# Patient Record
Sex: FEMALE | Race: OTHER | NOT HISPANIC OR LATINO | URBAN - METROPOLITAN AREA
[De-identification: names, ages, dates, MRNs, and addresses within clinical notes are randomized per-mention and may not be internally consistent; named-entity substitution may affect disease eponyms.]

---

## 2023-04-10 ENCOUNTER — EMERGENCY (EMERGENCY)
Facility: HOSPITAL | Age: 33
LOS: 1 days | Discharge: HOME | End: 2023-04-10
Attending: STUDENT IN AN ORGANIZED HEALTH CARE EDUCATION/TRAINING PROGRAM
Payer: COMMERCIAL

## 2023-04-10 VITALS
DIASTOLIC BLOOD PRESSURE: 83 MMHG | OXYGEN SATURATION: 99 % | RESPIRATION RATE: 18 BRPM | TEMPERATURE: 98 F | SYSTOLIC BLOOD PRESSURE: 135 MMHG | HEART RATE: 80 BPM

## 2023-04-10 VITALS
WEIGHT: 171.96 LBS | OXYGEN SATURATION: 98 % | DIASTOLIC BLOOD PRESSURE: 84 MMHG | RESPIRATION RATE: 17 BRPM | SYSTOLIC BLOOD PRESSURE: 156 MMHG | HEART RATE: 95 BPM | TEMPERATURE: 97 F

## 2023-04-10 DIAGNOSIS — O00.90 UNSPECIFIED ECTOPIC PREGNANCY WITHOUT INTRAUTERINE PREGNANCY: ICD-10-CM

## 2023-04-10 LAB
ALBUMIN SERPL ELPH-MCNC: 4 G/DL — SIGNIFICANT CHANGE UP (ref 3.5–5)
ALP SERPL-CCNC: 56 U/L — SIGNIFICANT CHANGE UP (ref 40–120)
ALT FLD-CCNC: 43 U/L DA — SIGNIFICANT CHANGE UP (ref 10–60)
ANION GAP SERPL CALC-SCNC: 6 MMOL/L — SIGNIFICANT CHANGE UP (ref 5–17)
APTT BLD: 36.4 SEC — HIGH (ref 27.5–35.5)
AST SERPL-CCNC: 23 U/L — SIGNIFICANT CHANGE UP (ref 10–40)
BASOPHILS # BLD AUTO: 0.05 K/UL — SIGNIFICANT CHANGE UP (ref 0–0.2)
BASOPHILS NFR BLD AUTO: 0.4 % — SIGNIFICANT CHANGE UP (ref 0–2)
BILIRUB SERPL-MCNC: 0.3 MG/DL — SIGNIFICANT CHANGE UP (ref 0.2–1.2)
BLD GP AB SCN SERPL QL: SIGNIFICANT CHANGE UP
BUN SERPL-MCNC: 11 MG/DL — SIGNIFICANT CHANGE UP (ref 7–18)
CALCIUM SERPL-MCNC: 9.9 MG/DL — SIGNIFICANT CHANGE UP (ref 8.4–10.5)
CHLORIDE SERPL-SCNC: 107 MMOL/L — SIGNIFICANT CHANGE UP (ref 96–108)
CO2 SERPL-SCNC: 25 MMOL/L — SIGNIFICANT CHANGE UP (ref 22–31)
CREAT SERPL-MCNC: 0.69 MG/DL — SIGNIFICANT CHANGE UP (ref 0.5–1.3)
EGFR: 120 ML/MIN/1.73M2 — SIGNIFICANT CHANGE UP
EOSINOPHIL # BLD AUTO: 0.16 K/UL — SIGNIFICANT CHANGE UP (ref 0–0.5)
EOSINOPHIL NFR BLD AUTO: 1.3 % — SIGNIFICANT CHANGE UP (ref 0–6)
GLUCOSE SERPL-MCNC: 98 MG/DL — SIGNIFICANT CHANGE UP (ref 70–99)
HCG SERPL-ACNC: 377 MIU/ML — HIGH
HCT VFR BLD CALC: 43.7 % — SIGNIFICANT CHANGE UP (ref 34.5–45)
HGB BLD-MCNC: 14.4 G/DL — SIGNIFICANT CHANGE UP (ref 11.5–15.5)
IMM GRANULOCYTES NFR BLD AUTO: 0.6 % — SIGNIFICANT CHANGE UP (ref 0–0.9)
INR BLD: 1.02 RATIO — SIGNIFICANT CHANGE UP (ref 0.88–1.16)
LYMPHOCYTES # BLD AUTO: 2.81 K/UL — SIGNIFICANT CHANGE UP (ref 1–3.3)
LYMPHOCYTES # BLD AUTO: 22.6 % — SIGNIFICANT CHANGE UP (ref 13–44)
MCHC RBC-ENTMCNC: 31 PG — SIGNIFICANT CHANGE UP (ref 27–34)
MCHC RBC-ENTMCNC: 33 GM/DL — SIGNIFICANT CHANGE UP (ref 32–36)
MCV RBC AUTO: 94 FL — SIGNIFICANT CHANGE UP (ref 80–100)
MONOCYTES # BLD AUTO: 0.8 K/UL — SIGNIFICANT CHANGE UP (ref 0–0.9)
MONOCYTES NFR BLD AUTO: 6.4 % — SIGNIFICANT CHANGE UP (ref 2–14)
NEUTROPHILS # BLD AUTO: 8.56 K/UL — HIGH (ref 1.8–7.4)
NEUTROPHILS NFR BLD AUTO: 68.7 % — SIGNIFICANT CHANGE UP (ref 43–77)
NRBC # BLD: 0 /100 WBCS — SIGNIFICANT CHANGE UP (ref 0–0)
PLATELET # BLD AUTO: 272 K/UL — SIGNIFICANT CHANGE UP (ref 150–400)
POTASSIUM SERPL-MCNC: 3.8 MMOL/L — SIGNIFICANT CHANGE UP (ref 3.5–5.3)
POTASSIUM SERPL-SCNC: 3.8 MMOL/L — SIGNIFICANT CHANGE UP (ref 3.5–5.3)
PROT SERPL-MCNC: 8.2 G/DL — SIGNIFICANT CHANGE UP (ref 6–8.3)
PROTHROM AB SERPL-ACNC: 12.1 SEC — SIGNIFICANT CHANGE UP (ref 10.5–13.4)
RBC # BLD: 4.65 M/UL — SIGNIFICANT CHANGE UP (ref 3.8–5.2)
RBC # FLD: 11.9 % — SIGNIFICANT CHANGE UP (ref 10.3–14.5)
SODIUM SERPL-SCNC: 138 MMOL/L — SIGNIFICANT CHANGE UP (ref 135–145)
WBC # BLD: 12.45 K/UL — HIGH (ref 3.8–10.5)
WBC # FLD AUTO: 12.45 K/UL — HIGH (ref 3.8–10.5)

## 2023-04-10 PROCEDURE — 86901 BLOOD TYPING SEROLOGIC RH(D): CPT

## 2023-04-10 PROCEDURE — 99285 EMERGENCY DEPT VISIT HI MDM: CPT

## 2023-04-10 PROCEDURE — 80053 COMPREHEN METABOLIC PANEL: CPT

## 2023-04-10 PROCEDURE — 86900 BLOOD TYPING SEROLOGIC ABO: CPT

## 2023-04-10 PROCEDURE — 76801 OB US < 14 WKS SINGLE FETUS: CPT | Mod: 26

## 2023-04-10 PROCEDURE — 85730 THROMBOPLASTIN TIME PARTIAL: CPT

## 2023-04-10 PROCEDURE — 76801 OB US < 14 WKS SINGLE FETUS: CPT

## 2023-04-10 PROCEDURE — 76817 TRANSVAGINAL US OBSTETRIC: CPT | Mod: 26

## 2023-04-10 PROCEDURE — 84702 CHORIONIC GONADOTROPIN TEST: CPT

## 2023-04-10 PROCEDURE — 36415 COLL VENOUS BLD VENIPUNCTURE: CPT

## 2023-04-10 PROCEDURE — 99284 EMERGENCY DEPT VISIT MOD MDM: CPT | Mod: 25

## 2023-04-10 PROCEDURE — 96372 THER/PROPH/DIAG INJ SC/IM: CPT

## 2023-04-10 PROCEDURE — 85025 COMPLETE CBC W/AUTO DIFF WBC: CPT

## 2023-04-10 PROCEDURE — 76817 TRANSVAGINAL US OBSTETRIC: CPT

## 2023-04-10 PROCEDURE — 86850 RBC ANTIBODY SCREEN: CPT

## 2023-04-10 PROCEDURE — 85610 PROTHROMBIN TIME: CPT

## 2023-04-10 RX ORDER — METHOTREXATE 2.5 MG/1
90 TABLET ORAL ONCE
Refills: 0 | Status: COMPLETED | OUTPATIENT
Start: 2023-04-10 | End: 2023-04-10

## 2023-04-10 RX ADMIN — METHOTREXATE 90 MILLIGRAM(S): 2.5 TABLET ORAL at 22:29

## 2023-04-10 NOTE — ED PROVIDER NOTE - PHYSICAL EXAMINATION
General: well appearing female, no acute distress   HEENT: normocephalic, atraumatic   Respiratory: normal work of breathing  Abdomen: soft, non-tender, no guarding or rebound   MSK: no swelling or tenderness of lower extremities, moving all extremities spontaneously   Skin: warm, dry   Neuro: A&Ox3  Psych: appropriate affect

## 2023-04-10 NOTE — ED PROVIDER NOTE - OBJECTIVE STATEMENT
32F, , LMP 23, presenting with concern for ectopic pregnancy. patient is following with her outpatient OB and had vaginal bleeding for 3 weeks and had a drop in her beta-Hcg. had a D&C performed but pathology showed no products of conception and beta-Hcg still elevated.

## 2023-04-10 NOTE — CONSULT NOTE ADULT - SUBJECTIVE AND OBJECTIVE BOX
31yo female  LMP 23 presents to ED as instructed by her Ob/GYn Dr. Ángel Toledo for methotrexate for suspected ectopic pregnancy. Pt states she has been followed since 3/14/23 after first episode of vaginal bleeding at 5-6weeks pregnant. She shows records of her labs as follows:  Bhcg 3/14 1158          3/24 602          3/30 659  On 23, patient had a D&C however pathology report shows no products of conception and on follow up , her Bhcg was 548 at which time he instructed her to come in.  Pt states she has no acute issues or complaints. Denies vaginal bleeding, abdominal/pelvic pain, nausea/vomiting, chest pains, sob, headache, or any other symptoms.    PMH: denies  PSH: Lasik  Pgyn: denies h/o fibroids, ovarian cysts, abnormal pap smear  Meds: none  Allergies; NKDA  Social: denies smoking, etoh or drug use    PE: Pt seen with Dr. Jackson, seated comfortably and in NAD  ICU Vital Signs Last 24 Hrs  T(C): 36.9 (10 Apr 2023 20:24), Max: 36.9 (10 Apr 2023 20:24)  T(F): 98.4 (10 Apr 2023 20:24), Max: 98.4 (10 Apr 2023 20:24)  HR: 80 (10 Apr 2023 20:24) (80 - 95)  BP: 135/83 (10 Apr 2023 20:24) (135/83 - 156/84)  RR: 18 (10 Apr 2023 20:24) (17 - 18)  SpO2: 99% (10 Apr 2023 20:24) (98% - 99%)    O2 Parameters below as of 10 Apr 2023 20:24  Patient On (Oxygen Delivery Method): room air    Abd: soft, NT; no guarding or rebound  Gyn: patient defers vaginal exam, no bleeding reported    Labs                        14.4   12.45 )-----------( 272      ( 10 Apr 2023 18:21 )             43.7     04-10    138  |  107  |  11  ----------------------------<  98  3.8   |  25  |  0.69    Ca    9.9      10 Apr 2023 18:21    TPro  8.2  /  Alb  4.0  /  TBili  0.3  /  DBili  x   /  AST  23  /  ALT  43  /  AlkPhos  56  04-10    ABO RH Interpretation: O POS    HCG Quantitative, Serum: 377    US Transvaginal, OB (04.10.23 @ 17:14) >  PROCEDURE DATE:  04/10/2023    INTERPRETATION:  CLINICAL INFORMATION: Evaluate forectopic pregnancy.  LMP: 2023  Estimated Gestational Age by LMP: 9 weeks 5 days  COMPARISON: None available.  Endovaginal and transabdominal pelvic sonogram.  FINDINGS:  Uterus: 6.5 x 3.5 x 3.8 cm. There is a subserosal fibroid measuring 1.7 x   2.2 x 1.8 cm. No intrauterine or extrauterine gestational sac identified.  Endometrium measures 6 mm in thickness.  Right ovary: 1.6 cm x 1.5 cm x 2.1 cm. Within normal limits. Simple right   ovarian cyst measuring 1.6 x 1.5 x 1.5 cm. There is color Doppler flow.  Left ovary: 3.0 cm x 1.3 cm x 2.4 cm. Within normal limits. There is   color Doppler flow.  Fluid: Small amount of complex fluid in the cul-de-sac.  IMPRESSION:  Neither an intrauterine nor extrauterine gestation is identified. This   represents a pregnancy of indeterminate location. Differential diagnosis   includes early normal IUP, pregnancy failure or ectopic pregnancy. Serial   hCG and ultrasound are recommended to determine the significance of these   findings.    Dr. Jackson at bedside

## 2023-04-10 NOTE — ED PROVIDER NOTE - CLINICAL SUMMARY MEDICAL DECISION MAKING FREE TEXT BOX
32-year-old female presenting with concern for ectopic pregnancy.  Patient nontender on exam and has no vaginal bleeding.  Will repeat beta-hCG and ultrasound to assess for possible ectopic pregnancy.  Will consult OB.

## 2023-04-10 NOTE — ED PROVIDER NOTE - PROGRESS NOTE DETAILS
consult delayed 2/2 emergency on the floor. awaiting GYN. John Curry patient again updated on delay. attending still managing emergent case. John Curry GYn at bedside. will consent patient for methotrexate. John Curry

## 2023-04-10 NOTE — ED PROVIDER NOTE - NSFOLLOWUPINSTRUCTIONS_ED_ALL_ED_FT
You were seen in the emergency department for concern for ectopic pregnancy.    Please follow-up with your OB/GYN within the next 48 hours.    Please take Tylenol and ibuprofen as prescribed on the bottles for pain control.    If you have any worsening symptoms, severe abdominal pain chest pain or trouble breathing or you develop a fever please return to the emergency department.

## 2023-04-10 NOTE — CONSULT NOTE ADULT - PROBLEM SELECTOR RECOMMENDATION 9
methotrexate 90mg ordered  consents obtained and risk/benefits and side effects explained  Precautions given to return to ED if any worsening pain, vaginal bleeding, chest pains, SOB, palpitations, vomiting, etc  Instructed to follow up with her Ob/Gyn on Friday for repeat labs; if unable , return to ED

## 2023-04-10 NOTE — ED PROVIDER NOTE - PATIENT PORTAL LINK FT
You can access the FollowMyHealth Patient Portal offered by [facility] by registering at the following website: [email or web address]. By joining [facility]’s FollowMyHealth portal, you will also be able to view your health information using other applications (apps) compatible with our system.

## 2024-03-04 NOTE — ED ADULT NURSE NOTE - MODE OF DISCHARGE
Return call to patient. Patient states she wants to be seen either Thursday or Friday this week. Informed patient Dr. Mason next available appointment is not until June. Patient states she needs to be seen. Informed patient there is no available appointments and I can schedule patient at next available, VV was offered to patient but patient states she does not have a phone. Patient scheduled in May. Appointment letter mailed to patient address on file. Patient verbalized understanding. Also instructed patient if she is having trouble walking and swelling to go to the nearest ER.    Ambulatory

## 2024-04-24 ENCOUNTER — APPOINTMENT (OUTPATIENT)
Dept: OBGYN | Facility: CLINIC | Age: 34
End: 2024-04-24
Payer: COMMERCIAL

## 2024-04-24 ENCOUNTER — APPOINTMENT (OUTPATIENT)
Dept: ANTEPARTUM | Facility: CLINIC | Age: 34
End: 2024-04-24
Payer: COMMERCIAL

## 2024-04-24 ENCOUNTER — ASOB RESULT (OUTPATIENT)
Age: 34
End: 2024-04-24

## 2024-04-24 ENCOUNTER — TRANSCRIPTION ENCOUNTER (OUTPATIENT)
Age: 34
End: 2024-04-24

## 2024-04-24 VITALS
SYSTOLIC BLOOD PRESSURE: 151 MMHG | DIASTOLIC BLOOD PRESSURE: 84 MMHG | BODY MASS INDEX: 33.68 KG/M2 | HEIGHT: 62 IN | WEIGHT: 183 LBS

## 2024-04-24 VITALS — DIASTOLIC BLOOD PRESSURE: 76 MMHG | SYSTOLIC BLOOD PRESSURE: 139 MMHG

## 2024-04-24 DIAGNOSIS — Z36.82 ENCOUNTER FOR ANTENATAL SCREENING FOR NUCHAL TRANSLUCENCY: ICD-10-CM

## 2024-04-24 PROBLEM — Z00.00 ENCOUNTER FOR PREVENTIVE HEALTH EXAMINATION: Status: ACTIVE | Noted: 2024-04-24

## 2024-04-24 PROCEDURE — 76813 OB US NUCHAL MEAS 1 GEST: CPT

## 2024-04-24 PROCEDURE — 99213 OFFICE O/P EST LOW 20 MIN: CPT

## 2024-04-24 PROCEDURE — 76801 OB US < 14 WKS SINGLE FETUS: CPT

## 2024-04-26 NOTE — HISTORY OF PRESENT ILLNESS
[FreeTextEntry1] : Patient is a 33 year old presenting at 12w seen in my office today for nuchal translucency testing. KODY 11/1/24. The limitations of testing were discussed with the patient and she was informed that this is a screening test. If she desires a diagnostic test like CVS or Amniocentesis, this may be performed. On sono today, 6cm lateral myoma noted

## 2024-04-26 NOTE — DISCUSSION/SUMMARY
[FreeTextEntry1] : The significance of nuchal translucency testing was explained to the patient and ultrasound was performed. The sensitivity of the test can be improved by combining with second trimester quad screening. This type of sequential testing minimally increases the false positive rate, but the detection rate for Down syndrome is increased. If the sequential testing is desired, a second stage quad should be drawn and sent. As an alternative, this can be done in our office. If the patient does not desire sequential testing, then a single marker for AFP may be sent to any lab after 15 completed weeks gestation.  Prenatal diagnostic testing is clinically indicated for this patient. The limitations of NIPT testing were discussed with the patient and amniocentesis was noted to remain the gold standard for prenatal diagnostic testing. The significance of NIPT testing was reviewed and offered to the patient and she has agreed to testing. Blood was drawn and the results will be sent to your office in approximately 2 weeks. Sequential screening is recommended between 15-16 weeks. Anatomy scan is recommended at 19-20 weeks.

## 2024-04-30 LAB
ADDITIONAL US: NORMAL
CRL SCAN TWIN B: NORMAL
CRL SCAN: NORMAL
CROWN RUMP LENGTH TWIN B: NORMAL
CROWN RUMP LENGTH: 63.8 MM
DIA MOM: 1.25
DIA VALUE: 256.1 PG/ML
DOWN SYNDROME AGE RISK: NORMAL
DOWN SYNDROME INTERPRETATION: NORMAL
DOWN SYNDROME SCREENING RISK: NORMAL
FIRST TRIMESTER SCREEN COMMENTS: NORMAL
FIRST TRIMESTER SCREEN NOTE: NORMAL
FIRST TRIMESTER SCREEN RESULTS: NORMAL
FIRST TRIMESTER SCREEN TEST RESULTS: NORMAL
GEST. AGE ON COLLECTION DATE: 12.6 WEEKS
HCG MOM: 1.07
HCG VALUE: 86.4 IU/ML
MATERNAL AGE AT EDD: 32.1 YR
NT MOM TWIN B: NORMAL
NT TWIN B: NORMAL
NUCHAL TRANSLUCENCY (NT): 1.5 MM
NUCHAL TRANSLUCENCY MOM: 1.1
NUMBER OF FETUSES: 1
PAPP-A MOM: 0.8
PAPP-A VALUE: 644 NG/ML
RACE: NORMAL
SONOGRAPHER ID#: NORMAL
TRISOMY 18 AGE RISK: NORMAL
TRISOMY 18 INTERPRETATION: NORMAL
TRISOMY 18 SCREENING RISK: NORMAL
WEIGHT AFP: 183 LBS

## 2024-06-19 ENCOUNTER — APPOINTMENT (OUTPATIENT)
Dept: ANTEPARTUM | Facility: CLINIC | Age: 34
End: 2024-06-19
Payer: COMMERCIAL

## 2024-06-19 ENCOUNTER — APPOINTMENT (OUTPATIENT)
Dept: OBGYN | Facility: CLINIC | Age: 34
End: 2024-06-19
Payer: COMMERCIAL

## 2024-06-19 ENCOUNTER — ASOB RESULT (OUTPATIENT)
Age: 34
End: 2024-06-19

## 2024-06-19 VITALS
WEIGHT: 185 LBS | HEIGHT: 62 IN | BODY MASS INDEX: 34.04 KG/M2 | DIASTOLIC BLOOD PRESSURE: 83 MMHG | SYSTOLIC BLOOD PRESSURE: 157 MMHG

## 2024-06-19 VITALS — SYSTOLIC BLOOD PRESSURE: 148 MMHG | DIASTOLIC BLOOD PRESSURE: 79 MMHG

## 2024-06-19 PROCEDURE — 99213 OFFICE O/P EST LOW 20 MIN: CPT

## 2024-06-19 PROCEDURE — 76811 OB US DETAILED SNGL FETUS: CPT

## 2024-06-19 NOTE — HISTORY OF PRESENT ILLNESS
DOESN'T HAVE A RIDE    [FreeTextEntry1] : 34 y/o female presents to office for anatomy scan. Patient is currently 20 weeks pregnant. Anatomy scan done today. No gross abnormalities noted. Normal growth. Normal fluid. Normal movement. +FHR (see official sono report).  BP today in office is elevated at 157/83 and repeat was 148/79. At present the patient denies headache, visual changes, and RUQ pain.

## 2024-06-19 NOTE — DISCUSSION/SUMMARY
[FreeTextEntry1] : -anatomy sono done today WNL; see official sono report  -I have discussed the implications of hypertension in pregnancy with the patient. I have discussed the association of chronic hypertension with fetal growth issues, as well as the possible development of superimposed preeclampsia. Premature delivery is a possibility because of these associated complications. Pregnancy therefore should be closely monitored. Recommendations: - Baseline EKG. - Baseline eye examination. - Home blood pressure monitoring twice a day. - Baby ASA-81 mg 1.5 tabs daily - Serial fetal growth in second half of pregnancy, fetal testing in 3rd trimester. - If BP elevated, recommended start of procardia 30mg XL  -f/u PRN

## 2024-11-01 ENCOUNTER — INPATIENT (INPATIENT)
Facility: HOSPITAL | Age: 34
LOS: 1 days | Discharge: HOME | End: 2024-11-03
Attending: SPECIALIST | Admitting: SPECIALIST

## 2024-11-01 ENCOUNTER — TRANSCRIPTION ENCOUNTER (OUTPATIENT)
Age: 34
End: 2024-11-01

## 2024-11-01 VITALS
SYSTOLIC BLOOD PRESSURE: 137 MMHG | DIASTOLIC BLOOD PRESSURE: 76 MMHG | RESPIRATION RATE: 18 BRPM | HEART RATE: 69 BPM | TEMPERATURE: 98 F

## 2024-11-01 LAB
BASOPHILS # BLD AUTO: 0.04 K/UL — SIGNIFICANT CHANGE UP (ref 0–0.2)
BASOPHILS NFR BLD AUTO: 0.3 % — SIGNIFICANT CHANGE UP (ref 0–2)
BLD GP AB SCN SERPL QL: NEGATIVE — SIGNIFICANT CHANGE UP
EOSINOPHIL # BLD AUTO: 0.15 K/UL — SIGNIFICANT CHANGE UP (ref 0–0.5)
EOSINOPHIL NFR BLD AUTO: 1.3 % — SIGNIFICANT CHANGE UP (ref 0–6)
HCT VFR BLD CALC: 39.9 % — SIGNIFICANT CHANGE UP (ref 34.5–45)
HCV AB S/CO SERPL IA: 0.04 S/CO — SIGNIFICANT CHANGE UP (ref 0–0.99)
HCV AB SERPL-IMP: SIGNIFICANT CHANGE UP
HGB BLD-MCNC: 13.5 G/DL — SIGNIFICANT CHANGE UP (ref 11.5–15.5)
IANC: 8.04 K/UL — HIGH (ref 1.8–7.4)
IMM GRANULOCYTES NFR BLD AUTO: 0.9 % — SIGNIFICANT CHANGE UP (ref 0–0.9)
LYMPHOCYTES # BLD AUTO: 2.46 K/UL — SIGNIFICANT CHANGE UP (ref 1–3.3)
LYMPHOCYTES # BLD AUTO: 21.2 % — SIGNIFICANT CHANGE UP (ref 13–44)
MCHC RBC-ENTMCNC: 29.8 PG — SIGNIFICANT CHANGE UP (ref 27–34)
MCHC RBC-ENTMCNC: 33.8 G/DL — SIGNIFICANT CHANGE UP (ref 32–36)
MCV RBC AUTO: 88.1 FL — SIGNIFICANT CHANGE UP (ref 80–100)
MONOCYTES # BLD AUTO: 0.83 K/UL — SIGNIFICANT CHANGE UP (ref 0–0.9)
MONOCYTES NFR BLD AUTO: 7.1 % — SIGNIFICANT CHANGE UP (ref 2–14)
NEUTROPHILS # BLD AUTO: 8.04 K/UL — HIGH (ref 1.8–7.4)
NEUTROPHILS NFR BLD AUTO: 69.2 % — SIGNIFICANT CHANGE UP (ref 43–77)
NRBC # BLD: 0 /100 WBCS — SIGNIFICANT CHANGE UP (ref 0–0)
NRBC # FLD: 0 K/UL — SIGNIFICANT CHANGE UP (ref 0–0)
PLATELET # BLD AUTO: 268 K/UL — SIGNIFICANT CHANGE UP (ref 150–400)
RBC # BLD: 4.53 M/UL — SIGNIFICANT CHANGE UP (ref 3.8–5.2)
RBC # FLD: 13 % — SIGNIFICANT CHANGE UP (ref 10.3–14.5)
RH IG SCN BLD-IMP: POSITIVE — SIGNIFICANT CHANGE UP
RH IG SCN BLD-IMP: POSITIVE — SIGNIFICANT CHANGE UP
T PALLIDUM AB TITR SER: NEGATIVE — SIGNIFICANT CHANGE UP
WBC # BLD: 11.62 K/UL — HIGH (ref 3.8–10.5)
WBC # FLD AUTO: 11.62 K/UL — HIGH (ref 3.8–10.5)

## 2024-11-01 RX ORDER — ACETAMINOPHEN 500 MG
3 TABLET ORAL
Qty: 0 | Refills: 0 | DISCHARGE
Start: 2024-11-01

## 2024-11-01 RX ORDER — PRAMOXINE HCL 1 %
1 CREAM (GRAM) RECTAL EVERY 4 HOURS
Refills: 0 | Status: DISCONTINUED | OUTPATIENT
Start: 2024-11-01 | End: 2024-11-03

## 2024-11-01 RX ORDER — OXYTOCIN IN D5W-0.2% SODIUM CL 15/250 ML
167 PLASTIC BAG, INJECTION (ML) INTRAVENOUS
Qty: 30 | Refills: 0 | Status: COMPLETED | OUTPATIENT
Start: 2024-11-01 | End: 2024-11-01

## 2024-11-01 RX ORDER — MODIFIED LANOLIN
1 OINTMENT (GRAM) TOPICAL EVERY 6 HOURS
Refills: 0 | Status: DISCONTINUED | OUTPATIENT
Start: 2024-11-01 | End: 2024-11-03

## 2024-11-01 RX ORDER — HYDROCORTISONE 1 %
1 OINTMENT (GRAM) TOPICAL EVERY 6 HOURS
Refills: 0 | Status: DISCONTINUED | OUTPATIENT
Start: 2024-11-01 | End: 2024-11-03

## 2024-11-01 RX ORDER — OXYCODONE HYDROCHLORIDE 30 MG/1
5 TABLET ORAL
Refills: 0 | Status: DISCONTINUED | OUTPATIENT
Start: 2024-11-01 | End: 2024-11-03

## 2024-11-01 RX ORDER — DIBUCAINE 1 %
1 OINTMENT (GRAM) TOPICAL EVERY 6 HOURS
Refills: 0 | Status: DISCONTINUED | OUTPATIENT
Start: 2024-11-01 | End: 2024-11-03

## 2024-11-01 RX ORDER — ACETAMINOPHEN 500 MG
1000 TABLET ORAL ONCE
Refills: 0 | Status: COMPLETED | OUTPATIENT
Start: 2024-11-01 | End: 2024-11-01

## 2024-11-01 RX ORDER — ACETAMINOPHEN 500 MG
975 TABLET ORAL
Refills: 0 | Status: DISCONTINUED | OUTPATIENT
Start: 2024-11-01 | End: 2024-11-03

## 2024-11-01 RX ORDER — MAGNESIUM HYDROXIDE 1200 MG/15ML
30 SUSPENSION ORAL
Refills: 0 | Status: DISCONTINUED | OUTPATIENT
Start: 2024-11-01 | End: 2024-11-03

## 2024-11-01 RX ORDER — SODIUM CHLORIDE 9 MG/ML
3 INJECTION, SOLUTION INTRAMUSCULAR; INTRAVENOUS; SUBCUTANEOUS EVERY 8 HOURS
Refills: 0 | Status: DISCONTINUED | OUTPATIENT
Start: 2024-11-01 | End: 2024-11-03

## 2024-11-01 RX ORDER — CITRIC ACID/SODIUM CITRATE 334-500MG
15 SOLUTION, ORAL ORAL EVERY 6 HOURS
Refills: 0 | Status: DISCONTINUED | OUTPATIENT
Start: 2024-11-01 | End: 2024-11-01

## 2024-11-01 RX ORDER — DIPHENHYDRAMINE HCL 12.5MG/5ML
25 ELIXIR ORAL EVERY 6 HOURS
Refills: 0 | Status: DISCONTINUED | OUTPATIENT
Start: 2024-11-01 | End: 2024-11-03

## 2024-11-01 RX ORDER — SIMETHICONE 80 MG/1
80 TABLET, CHEWABLE ORAL EVERY 4 HOURS
Refills: 0 | Status: DISCONTINUED | OUTPATIENT
Start: 2024-11-01 | End: 2024-11-03

## 2024-11-01 RX ORDER — OXYCODONE HYDROCHLORIDE 30 MG/1
5 TABLET ORAL ONCE
Refills: 0 | Status: DISCONTINUED | OUTPATIENT
Start: 2024-11-01 | End: 2024-11-03

## 2024-11-01 RX ORDER — KETOROLAC TROMETHAMINE 30 MG/ML
30 INJECTION INTRAMUSCULAR; INTRAVENOUS ONCE
Refills: 0 | Status: DISCONTINUED | OUTPATIENT
Start: 2024-11-01 | End: 2024-11-02

## 2024-11-01 RX ORDER — INFLUENZ VIR VAC TV P-SURF2003 15MCG/.5ML
0.5 SYRINGE (ML) INTRAMUSCULAR ONCE
Refills: 0 | Status: COMPLETED | OUTPATIENT
Start: 2024-11-01 | End: 2024-11-02

## 2024-11-01 RX ORDER — IBUPROFEN 200 MG
600 TABLET ORAL EVERY 6 HOURS
Refills: 0 | Status: COMPLETED | OUTPATIENT
Start: 2024-11-01 | End: 2025-09-30

## 2024-11-01 RX ORDER — CLOSTRIDIUM TETANI TOXOID ANTIGEN (FORMALDEHYDE INACTIVATED), CORYNEBACTERIUM DIPHTHERIAE TOXOID ANTIGEN (FORMALDEHYDE INACTIVATED), BORDETELLA PERTUSSIS TOXOID ANTIGEN (GLUTARALDEHYDE INACTIVATED), BORDETELLA PERTUSSIS FILAMENTOUS HEMAGGLUTININ ANTIGEN (FORMALDEHYDE INACTIVATED), BORDETELLA PERTUSSIS PERTACTIN ANTIGEN, AND BORDETELLA PERTUSSIS FIMBRIAE 2/3 ANTIGEN 5; 2; 2.5; 5; 3; 5 [LF]/.5ML; [LF]/.5ML; UG/.5ML; UG/.5ML; UG/.5ML; UG/.5ML
0.5 INJECTION, SUSPENSION INTRAMUSCULAR ONCE
Refills: 0 | Status: COMPLETED | OUTPATIENT
Start: 2024-11-01

## 2024-11-01 RX ORDER — IBUPROFEN 200 MG
1 TABLET ORAL
Qty: 0 | Refills: 0 | DISCHARGE
Start: 2024-11-01

## 2024-11-01 RX ORDER — OXYTOCIN IN D5W-0.2% SODIUM CL 15/250 ML
167 PLASTIC BAG, INJECTION (ML) INTRAVENOUS
Qty: 30 | Refills: 0 | Status: DISCONTINUED | OUTPATIENT
Start: 2024-11-01 | End: 2024-11-03

## 2024-11-01 RX ORDER — BENZOCAINE 200 MG/G
1 GEL ORAL EVERY 6 HOURS
Refills: 0 | Status: DISCONTINUED | OUTPATIENT
Start: 2024-11-01 | End: 2024-11-03

## 2024-11-01 RX ORDER — METHYLERGONOVINE MALEATE 0.2 MG
0.2 TABLET ORAL ONCE
Refills: 0 | Status: COMPLETED | OUTPATIENT
Start: 2024-11-01 | End: 2024-11-01

## 2024-11-01 RX ORDER — CHLORHEXIDINE GLUCONATE 40 MG/ML
1 SOLUTION TOPICAL DAILY
Refills: 0 | Status: DISCONTINUED | OUTPATIENT
Start: 2024-11-01 | End: 2024-11-01

## 2024-11-01 RX ORDER — ONDANSETRON HYDROCHLORIDE 2 MG/ML
4 INJECTION, SOLUTION INTRAMUSCULAR; INTRAVENOUS ONCE
Refills: 0 | Status: COMPLETED | OUTPATIENT
Start: 2024-11-01 | End: 2024-11-01

## 2024-11-01 RX ORDER — PRENATAL VIT/IRON FUM/FOLIC AC 60 MG-1 MG
1 TABLET ORAL DAILY
Refills: 0 | Status: DISCONTINUED | OUTPATIENT
Start: 2024-11-01 | End: 2024-11-03

## 2024-11-01 RX ORDER — OXYTOCIN IN D5W-0.2% SODIUM CL 15/250 ML
PLASTIC BAG, INJECTION (ML) INTRAVENOUS
Qty: 30 | Refills: 0 | Status: DISCONTINUED | OUTPATIENT
Start: 2024-11-01 | End: 2024-11-02

## 2024-11-01 RX ORDER — PIPERACILLIN AND TAZOBACTAM .5; 4 G/20ML; G/20ML
4.5 INJECTION, POWDER, LYOPHILIZED, FOR SOLUTION INTRAVENOUS EVERY 8 HOURS
Refills: 0 | Status: DISCONTINUED | OUTPATIENT
Start: 2024-11-01 | End: 2024-11-03

## 2024-11-01 RX ADMIN — Medication 2 MILLIUNIT(S)/MIN: at 11:15

## 2024-11-01 RX ADMIN — Medication 167 MILLIUNIT(S)/MIN: at 11:13

## 2024-11-01 RX ADMIN — Medication 1000 MILLIGRAM(S): at 19:12

## 2024-11-01 RX ADMIN — Medication 400 MILLIGRAM(S): at 18:30

## 2024-11-01 RX ADMIN — Medication 500 MILLILITER(S): at 13:20

## 2024-11-01 RX ADMIN — Medication 1000 MILLIGRAM(S): at 04:42

## 2024-11-01 RX ADMIN — SODIUM CHLORIDE 3 MILLILITER(S): 9 INJECTION, SOLUTION INTRAMUSCULAR; INTRAVENOUS; SUBCUTANEOUS at 21:51

## 2024-11-01 RX ADMIN — ONDANSETRON HYDROCHLORIDE 4 MILLIGRAM(S): 2 INJECTION, SOLUTION INTRAMUSCULAR; INTRAVENOUS at 22:23

## 2024-11-01 RX ADMIN — CHLORHEXIDINE GLUCONATE 1 APPLICATION(S): 40 SOLUTION TOPICAL at 02:08

## 2024-11-01 RX ADMIN — Medication 1000 MILLILITER(S): at 19:01

## 2024-11-01 RX ADMIN — Medication 167 MILLIUNIT(S)/MIN: at 22:22

## 2024-11-01 RX ADMIN — Medication 0.2 MILLIGRAM(S): at 21:33

## 2024-11-01 RX ADMIN — Medication 400 MILLIGRAM(S): at 04:12

## 2024-11-01 RX ADMIN — Medication 125 MILLILITER(S): at 02:15

## 2024-11-01 RX ADMIN — PIPERACILLIN AND TAZOBACTAM 200 GRAM(S): .5; 4 INJECTION, POWDER, LYOPHILIZED, FOR SOLUTION INTRAVENOUS at 19:07

## 2024-11-01 NOTE — OB RN PATIENT PROFILE - FALL HARM RISK - UNIVERSAL INTERVENTIONS
Bed in lowest position, wheels locked, appropriate side rails in place/Call bell, personal items and telephone in reach/Instruct patient to call for assistance before getting out of bed or chair/Non-slip footwear when patient is out of bed/[place] to call system/Physically safe environment - no spills, clutter or unnecessary equipment/Purposeful Proactive Rounding/Room/bathroom lighting operational, light cord in reach

## 2024-11-01 NOTE — OB PROVIDER H&P - NSHPPHYSICALEXAM_GEN_ALL_CORE
Vital Signs Last 24 Hrs  T(C): 36.8 (01 Nov 2024 01:40), Max: 36.8 (01 Nov 2024 01:25)  T(F): 98.2 (01 Nov 2024 01:40), Max: 98.24 (01 Nov 2024 01:25)  HR: 68 (01 Nov 2024 01:42) (68 - 69)  BP: 120/57 (01 Nov 2024 01:42) (120/57 - 137/76)  BP(mean): --  RR: 18 (01 Nov 2024 01:40) (18 - 18)  SpO2: --    Parameters below as of 01 Nov 2024 01:40  Patient On (Oxygen Delivery Method): room air        SVE: 0/0/-3  FHT: 150 bpm/mod jose m/+ accels/- decels   [place]: q 2-4 min   Sono: Vertex

## 2024-11-01 NOTE — OB PROVIDER H&P - NSLOWPPHRISK_OBGYN_A_OB
No previous uterine incision/Davis Pregnancy/Less than or equal to 4 previous vaginal births/No known bleeding disorder/No history of postpartum hemorrhage/No other PPH risks indicated

## 2024-11-01 NOTE — OB PROVIDER LABOR PROGRESS NOTE - ASSESSMENT
- VE 8.5/80/0  - s/p BC  - AROM @ 1pm   - Pit since 11am, now paused  - Cont fetal/maternal monitoring  - Will reassess PRN    Discussed with Dr. Maddie Goodson, PGY-1    
Pt experiencing more discomfort, currently ambulating for pain control  Exp mgmt as had made cervical change since 2am with one dose of buccalc ytotec adn is mag    d/w attg Dr Maddie Lane  PGY4
[facility]
33y/o  at 40wsk eIOL with cervical change & cat 2 tracing.     plan  -decreased pitocin from 10mu/min-> 6mu/min  -repositioning  -500cc LR bolus  -peds at delivery for meconium    Discussed w/ Dr Perkins & Neda HAHN

## 2024-11-01 NOTE — OB PROVIDER DELIVERY SUMMARY - NSPROVIDERDELIVERYNOTE_OBGYN_ALL_OB_FT
Pt fully dilated and pushing.  Recurrent late decelerations with pushing, la at 70s.  Discussed instrumental delivery with pt and family, benefit to fetus from expedited delivery.  Reviewed risks of fetal scalp laceration, cephalohematoma, subgaleal bleed, maternal laceration.  Pt in agreement for assisted delivery.  Vacuum placed at +2 station.  1 pull, no pop-off.   Delivered viable infant over intact perineum.  Nose and mouth bulb suctioned.  Cord clamped and cut after delay.  Infant handed to awaiting pediatricians.   Cord gases sent.  Placenta delivered spontaneously and intact.  3a laceration noted. External sphincter reapproximated using Lacerations repaired with excellent hemostasis and restoration of anatomy.  Fundus firm.  Vault empty. Pt fully dilated and pushing.  Recurrent late decelerations with pushing, la at 70s.  Discussed instrumental delivery with pt and family, benefit to fetus from expedited delivery.  Reviewed risks of fetal scalp laceration, cephalohematoma, subgaleal bleed, maternal laceration.  Pt in agreement for assisted delivery.  Vacuum placed at +2 station.  1 pull, no pop-off.   Delivered viable infant over intact perineum.  Nose and mouth bulb suctioned.  Cord clamped and cut after delay.  Infant handed to awaiting pediatricians.   Cord gases sent.  Placenta delivered spontaneously and intact.  3a laceration noted. External sphincter reapproximated using 0 vicryl suture. Vaginal mucosa and perineum reapproximated using 2.0 chromic. Rectal exam performed and rectal mucosa and internal sphincter intact.   Fundus firm.  Vault empty. IM methergine administered prophylactically

## 2024-11-01 NOTE — OB PROVIDER DELIVERY SUMMARY - NSSELHIDDEN_OBGYN_ALL_OB_FT
[NS_DeliveryAttending1_OBGYN_ALL_OB_FT:[id number]],[NS_DeliveryAssist1_OBGYN_ALL_OB_FT:[id number]],[NS_DeliveryRN_OBGYN_ALL_OB_FT:[id number]]

## 2024-11-01 NOTE — DISCHARGE NOTE OB - NS MD DC FALL RISK RISK
For information on Fall & Injury Prevention, visit: [email or web address] OR  [email or web address] OR  https://www.cdc.gov/steadi/patient.html

## 2024-11-01 NOTE — OB PROVIDER H&P - ASSESSMENT
A/P: Pt is a 34y  @ 40w who presents for eIOL    1. Admit to Labor and Delivery. Routine Labs. IV Fluids  2. IOL with BC, CB when able   3. Fetus: Vertex, Reactive/Continue fetal monitoring  4. GBS neg  5. Pain: IV pain meds/epidural PRN      Ashley Sacks, PGY 1  Obstetrics and Gynecology    Discussed with Dr. King

## 2024-11-01 NOTE — DISCHARGE NOTE OB - CARE PLAN
1 Principal Discharge DX:	Forceps or vacuum extractor delivery, delivered  Assessment and plan of treatment:	Follow up in office in 6 weeks for postpartum visit.

## 2024-11-01 NOTE — DISCHARGE NOTE OB - MEDICATION SUMMARY - MEDICATIONS TO TAKE
I will START or STAY ON the medications listed below when I get home from the hospital:    ibuprofen 600 mg oral tablet  -- 1 tab(s) by mouth every 6 hours  -- Indication: For Pain    acetaminophen 325 mg oral tablet  -- 3 tab(s) by mouth every 6 hours  -- Indication: For pain

## 2024-11-01 NOTE — OB PROVIDER H&P - HISTORY OF PRESENT ILLNESS
HPI: Pt is a 34y   @ 40w  presenting for eIOL  Fetal movement (+)  Leakage of fluid (-)  Contractions (-)  Vaginal bleeding (-)  GBS neg   Estimated fetal weight: 3620    No PNC complications     OBHx: Ectopic x1 s/p MTX, D&C 2023  GynHx: Fibroids (4x4x4 subserosal posterior RITA, 3x2x1 subserosal anterior)  PMHx: Denies  PSHx: Denies  Med: PNV  All: NKDA  Psych: Denies hx of mental health issues  SH: Denies hx of smoking, drinking, or drug usage during the pregnancy

## 2024-11-01 NOTE — DISCHARGE NOTE OB - FINANCIAL ASSISTANCE
[facility] provides services at a reduced cost to those who are determined to be eligible through [facility]’s financial assistance program. Information regarding [facility]’s financial assistance program can be found by going to [email or web address] or by calling [phone].

## 2024-11-01 NOTE — OB RN DELIVERY SUMMARY - NSSELHIDDEN_OBGYN_ALL_OB_FT
[NS_DeliveryAttending1_OBGYN_ALL_OB_FT:[id number]],[NS_DeliveryAssist1_OBGYN_ALL_OB_FT:[id number]],[NS_DeliveryRN_OBGYN_ALL_OB_FT:[id number]=]

## 2024-11-01 NOTE — OB NEONATOLOGY/PEDIATRICIAN DELIVERY SUMMARY - NSPEDSNEONOTESA_OBGYN_ALL_OB_FT
Pediatrician called to delivery for meconium fluids and category II tracing. 40.0 wk AGA male born via vacuum assisted vaginal delivery to a 33y/o  mother. No significant maternal or prenatal history. Maternal labs include Blood Type O+ , HIV - , RPR NR , Rubella I , Hep B - , GBS - on 10/9. ROM at 13:20 on  with thick meconium fluids (ROM hours: 8H5M).  Cord clamping was delayed 60secs. Baby emerged vigorous, crying, was warmed, dried, suctioned, and stimulated with APGARS of 8/9 . Nuchal x1. Resuscitation included deep tracheal suctioning. Highest maternal temp: 39.2C. Mom treated with zosyn <2hr before delivery (19:06 on ).  EOS 1.39. Mom plans to initiate breastfeeding, consents Hep B vaccine and declines circumcision. Admitted under Dr. Belinda Barry. Baby stable for transfer to  nursery. Parents updated.    :    TOB: 21:25  BW: 3260g

## 2024-11-01 NOTE — DISCHARGE NOTE OB - PATIENT PORTAL LINK FT
You can access the FollowMyHealth Patient Portal offered by [facility] by registering at the following website: [email or web address]. By joining [employer]’s FollowMyHealth portal, you will also be able to view your health information using other applications (apps) compatible with our system.

## 2024-11-01 NOTE — OB PROVIDER LABOR PROGRESS NOTE - NS_SUBJECTIVE/OBJECTIVE_OBGYN_ALL_OB_FT
Pt seen & examined at bedside for labor progress 2/2 recurrent late decels. Resting comfortably with epidural.     Vital Signs Last 24 Hrs  T(C): 37.1 (01 Nov 2024 10:00), Max: 37.1 (01 Nov 2024 09:30)  T(F): 98.78 (01 Nov 2024 10:00), Max: 98.78 (01 Nov 2024 09:30)  HR: 75 (01 Nov 2024 13:17) (55 - 101)  BP: 128/61 (01 Nov 2024 13:15) (101/52 - 141/75)  BP(mean): --  RR: 18 (01 Nov 2024 10:00) (18 - 18)  SpO2: 100% (01 Nov 2024 13:17) (97% - 100%)    Parameters below as of 01 Nov 2024 01:40  Patient On (Oxygen Delivery Method): room air        , minimal variability, +accel with scalp stim, +recurrent late decels, cat 2  TOCO: Q2-3min  VE: 5/70/-3, AROM thick meconium
Patient seen and examined for progression of labor.     T(C): 37.4 (11-01-24 @ 16:15), Max: 37.4 (11-01-24 @ 16:15)  HR: 69 (11-01-24 @ 16:52) (55 - 117)  BP: 111/59 (11-01-24 @ 16:46) (101/52 - 145/71)  RR: 16 (11-01-24 @ 16:15) (16 - 18)  SpO2: 100% (11-01-24 @ 16:52) (91% - 100%)
Pt examined due to frequent contractions, unable to receive other induction agent since 215am

## 2024-11-01 NOTE — OB RN DELIVERY SUMMARY - NS_SEPSISRSKCALC_OBGYN_ALL_OB_FT
EOS calculated successfully. EOS Risk Factor: 0.5/1000 live births ([facility] national incidence); GA=40w;Temp=102.56; ROM=8.083; GBS='Negative'; Antibiotics='Broad spectrum antibiotics 2-3.9 hrs prior to birth'

## 2024-11-01 NOTE — OB PROVIDER H&P - NS_TRIAGEMEMBRANE_OBGYN_ALL_OB
Intact - MRI brain with no new or enlarging metastases ; stable right temporal lobe , left Meckel's cave and right occipital lesions  - also c/o abd pain ;  CT Abd/pelvis unremarkable  - Tramadol did not help  - will start IV Tylenol 1g q 6 ATC and Oxy ir 5mg po q 6 ATC x 24 hours - MRI brain with no new or enlarging metastases ; stable right temporal lobe , left Meckel's cave and right occipital lesions  - also c/o abd pain ;  CT Abd/pelvis unremarkable  - will start IV Diluadid 0.5mg IVP Q4 PRN  - bowel regimen  - reports no bm x 4 days ; abd exam is ttp, non distended, bs+  - check abd xr for stool burden

## 2024-11-01 NOTE — DISCHARGE NOTE OB - CARE PROVIDER_API CALL
[facility]  Obstetrics and Gynecology  [address] [place] [address]  Phone: [phone]  Fax: [phone]  Follow Up Time:

## 2024-11-02 LAB
ADD ON TEST-SPECIMEN IN LAB: SIGNIFICANT CHANGE UP
ALBUMIN SERPL ELPH-MCNC: 2.9 G/DL — LOW (ref 3.3–5)
ALP SERPL-CCNC: 152 U/L — HIGH (ref 40–120)
ALT FLD-CCNC: 21 U/L — SIGNIFICANT CHANGE UP (ref 4–33)
ANION GAP SERPL CALC-SCNC: 14 MMOL/L — SIGNIFICANT CHANGE UP (ref 7–14)
ANISOCYTOSIS BLD QL: SIGNIFICANT CHANGE UP
AST SERPL-CCNC: 29 U/L — SIGNIFICANT CHANGE UP (ref 4–32)
BASOPHILS # BLD AUTO: 0 K/UL — SIGNIFICANT CHANGE UP (ref 0–0.2)
BASOPHILS # BLD AUTO: 0.06 K/UL — SIGNIFICANT CHANGE UP (ref 0–0.2)
BASOPHILS NFR BLD AUTO: 0 % — SIGNIFICANT CHANGE UP (ref 0–2)
BASOPHILS NFR BLD AUTO: 0.3 % — SIGNIFICANT CHANGE UP (ref 0–2)
BILIRUB SERPL-MCNC: 0.4 MG/DL — SIGNIFICANT CHANGE UP (ref 0.2–1.2)
BUN SERPL-MCNC: 9 MG/DL — SIGNIFICANT CHANGE UP (ref 7–23)
CALCIUM SERPL-MCNC: 8.9 MG/DL — SIGNIFICANT CHANGE UP (ref 8.4–10.5)
CHLORIDE SERPL-SCNC: 102 MMOL/L — SIGNIFICANT CHANGE UP (ref 98–107)
CO2 SERPL-SCNC: 22 MMOL/L — SIGNIFICANT CHANGE UP (ref 22–31)
CREAT SERPL-MCNC: 0.86 MG/DL — SIGNIFICANT CHANGE UP (ref 0.5–1.3)
EGFR: 92 ML/MIN/1.73M2 — SIGNIFICANT CHANGE UP
EOSINOPHIL # BLD AUTO: 0 K/UL — SIGNIFICANT CHANGE UP (ref 0–0.5)
EOSINOPHIL # BLD AUTO: 0.08 K/UL — SIGNIFICANT CHANGE UP (ref 0–0.5)
EOSINOPHIL NFR BLD AUTO: 0 % — SIGNIFICANT CHANGE UP (ref 0–6)
EOSINOPHIL NFR BLD AUTO: 0.3 % — SIGNIFICANT CHANGE UP (ref 0–6)
GLUCOSE SERPL-MCNC: 122 MG/DL — HIGH (ref 70–99)
HCT VFR BLD CALC: 31.1 % — LOW (ref 34.5–45)
HCT VFR BLD CALC: 32.1 % — LOW (ref 34.5–45)
HGB BLD-MCNC: 10.5 G/DL — LOW (ref 11.5–15.5)
HGB BLD-MCNC: 10.8 G/DL — LOW (ref 11.5–15.5)
IANC: 20.08 K/UL — HIGH (ref 1.8–7.4)
IANC: 23.06 K/UL — HIGH (ref 1.8–7.4)
IMM GRANULOCYTES NFR BLD AUTO: 0.6 % — SIGNIFICANT CHANGE UP (ref 0–0.9)
LYMPHOCYTES # BLD AUTO: 1.35 K/UL — SIGNIFICANT CHANGE UP (ref 1–3.3)
LYMPHOCYTES # BLD AUTO: 1.54 K/UL — SIGNIFICANT CHANGE UP (ref 1–3.3)
LYMPHOCYTES # BLD AUTO: 5.2 % — LOW (ref 13–44)
LYMPHOCYTES # BLD AUTO: 6.6 % — LOW (ref 13–44)
MACROCYTES BLD QL: SIGNIFICANT CHANGE UP
MANUAL SMEAR VERIFICATION: SIGNIFICANT CHANGE UP
MCHC RBC-ENTMCNC: 30.2 PG — SIGNIFICANT CHANGE UP (ref 27–34)
MCHC RBC-ENTMCNC: 30.6 PG — SIGNIFICANT CHANGE UP (ref 27–34)
MCHC RBC-ENTMCNC: 33.6 G/DL — SIGNIFICANT CHANGE UP (ref 32–36)
MCHC RBC-ENTMCNC: 33.8 G/DL — SIGNIFICANT CHANGE UP (ref 32–36)
MCV RBC AUTO: 89.4 FL — SIGNIFICANT CHANGE UP (ref 80–100)
MCV RBC AUTO: 90.9 FL — SIGNIFICANT CHANGE UP (ref 80–100)
MONOCYTES # BLD AUTO: 0.23 K/UL — SIGNIFICANT CHANGE UP (ref 0–0.9)
MONOCYTES # BLD AUTO: 1.29 K/UL — HIGH (ref 0–0.9)
MONOCYTES NFR BLD AUTO: 0.9 % — LOW (ref 2–14)
MONOCYTES NFR BLD AUTO: 5.6 % — SIGNIFICANT CHANGE UP (ref 2–14)
NEUTROPHILS # BLD AUTO: 20.08 K/UL — HIGH (ref 1.8–7.4)
NEUTROPHILS # BLD AUTO: 24.39 K/UL — HIGH (ref 1.8–7.4)
NEUTROPHILS NFR BLD AUTO: 78.1 % — HIGH (ref 43–77)
NEUTROPHILS NFR BLD AUTO: 86.6 % — HIGH (ref 43–77)
NEUTS BAND # BLD: 15.8 % — CRITICAL HIGH (ref 0–6)
NRBC # BLD: 0 /100 WBCS — SIGNIFICANT CHANGE UP (ref 0–0)
NRBC # BLD: 0 /100 WBCS — SIGNIFICANT CHANGE UP (ref 0–0)
NRBC # FLD: 0 K/UL — SIGNIFICANT CHANGE UP (ref 0–0)
NRBC # FLD: 0 K/UL — SIGNIFICANT CHANGE UP (ref 0–0)
PLAT MORPH BLD: NORMAL — SIGNIFICANT CHANGE UP
PLATELET # BLD AUTO: 211 K/UL — SIGNIFICANT CHANGE UP (ref 150–400)
PLATELET # BLD AUTO: 216 K/UL — SIGNIFICANT CHANGE UP (ref 150–400)
PLATELET COUNT - ESTIMATE: NORMAL — SIGNIFICANT CHANGE UP
POLYCHROMASIA BLD QL SMEAR: SLIGHT — SIGNIFICANT CHANGE UP
POTASSIUM SERPL-MCNC: 3.7 MMOL/L — SIGNIFICANT CHANGE UP (ref 3.5–5.3)
POTASSIUM SERPL-SCNC: 3.7 MMOL/L — SIGNIFICANT CHANGE UP (ref 3.5–5.3)
PROT SERPL-MCNC: 5.8 G/DL — LOW (ref 6–8.3)
RBC # BLD: 3.48 M/UL — LOW (ref 3.8–5.2)
RBC # BLD: 3.53 M/UL — LOW (ref 3.8–5.2)
RBC # FLD: 13.2 % — SIGNIFICANT CHANGE UP (ref 10.3–14.5)
RBC # FLD: 13.2 % — SIGNIFICANT CHANGE UP (ref 10.3–14.5)
RBC BLD AUTO: ABNORMAL
SODIUM SERPL-SCNC: 138 MMOL/L — SIGNIFICANT CHANGE UP (ref 135–145)
WBC # BLD: 23.19 K/UL — HIGH (ref 3.8–10.5)
WBC # BLD: 25.97 K/UL — HIGH (ref 3.8–10.5)
WBC # FLD AUTO: 23.19 K/UL — HIGH (ref 3.8–10.5)
WBC # FLD AUTO: 25.97 K/UL — HIGH (ref 3.8–10.5)

## 2024-11-02 RX ORDER — POLYETHYLENE GLYCOL 3350 17 G/17G
17 POWDER, FOR SOLUTION ORAL DAILY
Refills: 0 | Status: DISCONTINUED | OUTPATIENT
Start: 2024-11-02 | End: 2024-11-03

## 2024-11-02 RX ORDER — SENNA 187 MG
2 TABLET ORAL DAILY
Refills: 0 | Status: DISCONTINUED | OUTPATIENT
Start: 2024-11-02 | End: 2024-11-03

## 2024-11-02 RX ORDER — IBUPROFEN 200 MG
600 TABLET ORAL EVERY 6 HOURS
Refills: 0 | Status: DISCONTINUED | OUTPATIENT
Start: 2024-11-02 | End: 2024-11-03

## 2024-11-02 RX ADMIN — KETOROLAC TROMETHAMINE 30 MILLIGRAM(S): 30 INJECTION INTRAMUSCULAR; INTRAVENOUS at 00:43

## 2024-11-02 RX ADMIN — MAGNESIUM HYDROXIDE 30 MILLILITER(S): 1200 SUSPENSION ORAL at 06:58

## 2024-11-02 RX ADMIN — SODIUM CHLORIDE 3 MILLILITER(S): 9 INJECTION, SOLUTION INTRAMUSCULAR; INTRAVENOUS; SUBCUTANEOUS at 21:44

## 2024-11-02 RX ADMIN — PIPERACILLIN AND TAZOBACTAM 200 GRAM(S): .5; 4 INJECTION, POWDER, LYOPHILIZED, FOR SOLUTION INTRAVENOUS at 11:59

## 2024-11-02 RX ADMIN — Medication 600 MILLIGRAM(S): at 06:31

## 2024-11-02 RX ADMIN — Medication 975 MILLIGRAM(S): at 10:30

## 2024-11-02 RX ADMIN — Medication 600 MILLIGRAM(S): at 11:58

## 2024-11-02 RX ADMIN — Medication 975 MILLIGRAM(S): at 22:25

## 2024-11-02 RX ADMIN — Medication 975 MILLIGRAM(S): at 17:30

## 2024-11-02 RX ADMIN — BENZOCAINE 1 SPRAY(S): 200 GEL ORAL at 21:44

## 2024-11-02 RX ADMIN — Medication 1 APPLICATION(S): at 21:07

## 2024-11-02 RX ADMIN — Medication 0.5 MILLILITER(S): at 23:53

## 2024-11-02 RX ADMIN — Medication 2 TABLET(S): at 11:58

## 2024-11-02 RX ADMIN — Medication 600 MILLIGRAM(S): at 19:44

## 2024-11-02 RX ADMIN — Medication 600 MILLIGRAM(S): at 23:53

## 2024-11-02 RX ADMIN — Medication 600 MILLIGRAM(S): at 12:45

## 2024-11-02 RX ADMIN — Medication 975 MILLIGRAM(S): at 03:40

## 2024-11-02 RX ADMIN — PIPERACILLIN AND TAZOBACTAM 200 GRAM(S): .5; 4 INJECTION, POWDER, LYOPHILIZED, FOR SOLUTION INTRAVENOUS at 03:39

## 2024-11-02 RX ADMIN — Medication 1 TABLET(S): at 11:59

## 2024-11-02 RX ADMIN — Medication 975 MILLIGRAM(S): at 16:55

## 2024-11-02 RX ADMIN — Medication 600 MILLIGRAM(S): at 18:49

## 2024-11-02 RX ADMIN — Medication 975 MILLIGRAM(S): at 21:44

## 2024-11-02 RX ADMIN — Medication 975 MILLIGRAM(S): at 09:39

## 2024-11-02 NOTE — PROGRESS NOTE ADULT - SUBJECTIVE AND OBJECTIVE BOX
PPD#1- ATTENDING NOTE    S: Patient doing well. Minimal lochia. Pain controlled.    O: Vital Signs Last 24 Hrs  T(C): 36.6 (02 Nov 2024 06:28), Max: 39.2 (01 Nov 2024 18:22)  T(F): 97.8 (02 Nov 2024 06:28), Max: 102.56 (01 Nov 2024 18:22)  HR: 78 (02 Nov 2024 06:28) (55 - 118)  BP: 104/65 (02 Nov 2024 06:28) (100/56 - 146/92)  BP(mean): --  RR: 18 (02 Nov 2024 06:28) (16 - 20)  SpO2: 99% (02 Nov 2024 06:28) (83% - 100%)    Parameters below as of 02 Nov 2024 06:28  Patient On (Oxygen Delivery Method): room air        Gen: NAD  Abd: soft, NT, ND, fundus firm below umbilicus  Lochia: moderate  Ext: no tenderness    Labs:                        13.5   11.62 )-----------( 268      ( 01 Nov 2024 01:15 )             39.9       acetaminophen     Tablet .. 975 milliGRAM(s) Oral <User Schedule>  benzocaine 20%/menthol 0.5% Spray 1 Spray(s) Topical every 6 hours PRN  dibucaine 1% Ointment 1 Application(s) Topical every 6 hours PRN  diphenhydrAMINE 25 milliGRAM(s) Oral every 6 hours PRN  diphtheria/tetanus/pertussis (acellular) Vaccine (Adacel) 0.5 milliLiter(s) IntraMuscular once  hydrocortisone 1% Cream 1 Application(s) Topical every 6 hours PRN  ibuprofen  Tablet. 600 milliGRAM(s) Oral every 6 hours  influenza   Vaccine 0.5 milliLiter(s) IntraMuscular once  lanolin Ointment 1 Application(s) Topical every 6 hours PRN  magnesium hydroxide Suspension 30 milliLiter(s) Oral two times a day PRN  oxyCODONE    IR 5 milliGRAM(s) Oral once PRN  oxyCODONE    IR 5 milliGRAM(s) Oral every 3 hours PRN  oxytocin Infusion 167 milliUNIT(s)/Min IV Continuous <Continuous>  piperacillin/tazobactam IVPB.. 4.5 Gram(s) IV Intermittent every 8 hours  polyethylene glycol 3350 17 Gram(s) Oral daily  pramoxine 1%/zinc 5% Cream 1 Application(s) Topical every 4 hours PRN  prenatal multivitamin 1 Tablet(s) Oral daily  senna 2 Tablet(s) Oral daily  simethicone 80 milliGRAM(s) Chew every 4 hours PRN  sodium chloride 0.9% lock flush 3 milliLiter(s) IV Push every 8 hours  witch hazel Pads 1 Application(s) Topical every 4 hours PRN

## 2024-11-02 NOTE — CHART NOTE - NSCHARTNOTEFT_GEN_A_CORE
At bedside to discuss diagnosis of gHTN.     Patient denies HA, SOB, changes or spots in vision, new swelling in the hands and face, or RUQ/epigastric pain.   HELLP labs wnl, P/C not done (postpartum)    Vital Signs Last 24 Hrs  T(C): 36.5 (02 Nov 2024 10:00), Max: 39.2 (01 Nov 2024 18:22)  T(F): 97.7 (02 Nov 2024 10:00), Max: 102.56 (01 Nov 2024 18:22)  HR: 80 (02 Nov 2024 10:00) (55 - 118)  BP: 117/57 (02 Nov 2024 10:00) (100/56 - 146/92)  BP(mean): --  RR: 18 (02 Nov 2024 10:00) (16 - 20)  SpO2: 100% (02 Nov 2024 10:00) (83% - 100%)    Parameters below as of 02 Nov 2024 10:00  Patient On (Oxygen Delivery Method): room air    Discussed close BP monitoring and potential need for antihypertensive medications if BPs continue to be elevated.   Discussed sxs of PEC and if later meeting criteria and with severe features, may need to be treated with Magnesium sulfate for seizure prophylaxis.    Informed patient that given meeting criteria for gHTN, she is at increased risk of developing hypertensive disorders and/or PEC in future pregnancies. In addition, she was informed that she is at increased risk of developing hypertension and cardiovascular disease outside of pregnancy. In addition, discussed the following with the patient: how to take BP at home, what BP values are concerning (and what are the appropriate next steps given particular values), and the need for follow-up 48-72hrs after discharge. Patient states she has a BP cuff at home.    All questions were answered to the patient's apparent satisfaction.   Polly Cerda, PGY-1

## 2024-11-02 NOTE — PROGRESS NOTE ADULT - ASSESSMENT
A: 34y PPD#1 s/p VAVD doing well.    Plan:  Analgesia prn  Regular diet  Follow up am labs  Routine post partum care

## 2024-11-02 NOTE — PROGRESS NOTE ADULT - ASSESSMENT
A/P: 35yo PPD#1 s/p VAVD.  Patient is stable and doing well post-partum.     #VAVD  - 3a laceration   - c/w bowel regimen  - c/w low res diet     #chorio  - Chorio (38.3 11/1 @545p, 39.2 11/1@622p)    #postpartum  - Hct:   - Pain well controlled, continue current pain regimen  - Increase ambulation, SCDs when not ambulating  - Continue regular diet  - Postpartum contraception: ___    A Sacks, PGY1 A/P: 33yo PPD#1 s/p VAVD.  Patient is stable and doing well post-partum.     #VAVD  - 3a laceration   - c/w bowel regimen  - c/w low res diet     #chorio  - Chorio (38.3 11/1 @545p, 39.2 11/1@622p)  - c/w zosyn x24 hrs   - F/u BCx/UCx  - F/u AM CBC    #postpartum  - Hct: 39.9  - Pain well controlled, continue current pain regimen  - Increase ambulation, SCDs when not ambulating  - Continue regular diet  - F/u AM CBC     A Sacks, PGY1

## 2024-11-02 NOTE — PROGRESS NOTE ADULT - SUBJECTIVE AND OBJECTIVE BOX
OB Progress Note: VAVD PPD#1    S: 33yo  PPD#1 s/p VAVD. Patient feels well. Pain is well controlled. She is tolerating a regular diet and passing flatus. She is voiding spontaneously, and ambulating without difficulty. Denies CP/SOB. Denies lightheadedness/dizziness. Denies N/V.    O:  Vitals:  Vital Signs Last 24 Hrs  T(C): 36.6 (02 Nov 2024 06:28), Max: 39.2 (01 Nov 2024 18:22)  T(F): 97.8 (02 Nov 2024 06:28), Max: 102.56 (01 Nov 2024 18:22)  HR: 78 (02 Nov 2024 06:28) (55 - 118)  BP: 104/65 (02 Nov 2024 06:28) (100/56 - 146/92)  BP(mean): --  RR: 18 (02 Nov 2024 06:28) (16 - 20)  SpO2: 99% (02 Nov 2024 06:28) (83% - 100%)    Parameters below as of 02 Nov 2024 06:28  Patient On (Oxygen Delivery Method): room air        MEDICATIONS  (STANDING):  acetaminophen     Tablet .. 975 milliGRAM(s) Oral <User Schedule>  diphtheria/tetanus/pertussis (acellular) Vaccine (Adacel) 0.5 milliLiter(s) IntraMuscular once  ibuprofen  Tablet. 600 milliGRAM(s) Oral every 6 hours  influenza   Vaccine 0.5 milliLiter(s) IntraMuscular once  oxytocin Infusion 167 milliUNIT(s)/Min (167 mL/Hr) IV Continuous <Continuous>  piperacillin/tazobactam IVPB.. 4.5 Gram(s) IV Intermittent every 8 hours  polyethylene glycol 3350 17 Gram(s) Oral daily  prenatal multivitamin 1 Tablet(s) Oral daily  senna 2 Tablet(s) Oral daily  sodium chloride 0.9% lock flush 3 milliLiter(s) IV Push every 8 hours    MEDICATIONS  (PRN):  benzocaine 20%/menthol 0.5% Spray 1 Spray(s) Topical every 6 hours PRN for Perineal discomfort  dibucaine 1% Ointment 1 Application(s) Topical every 6 hours PRN Perineal discomfort  diphenhydrAMINE 25 milliGRAM(s) Oral every 6 hours PRN Pruritus  hydrocortisone 1% Cream 1 Application(s) Topical every 6 hours PRN Moderate Pain (4-6)  lanolin Ointment 1 Application(s) Topical every 6 hours PRN nipple soreness  magnesium hydroxide Suspension 30 milliLiter(s) Oral two times a day PRN Constipation  oxyCODONE    IR 5 milliGRAM(s) Oral once PRN Moderate to Severe Pain (4-10)  oxyCODONE    IR 5 milliGRAM(s) Oral every 3 hours PRN Moderate to Severe Pain (4-10)  pramoxine 1%/zinc 5% Cream 1 Application(s) Topical every 4 hours PRN Moderate Pain (4-6)  simethicone 80 milliGRAM(s) Chew every 4 hours PRN Gas  witch hazel Pads 1 Application(s) Topical every 4 hours PRN Perineal discomfort      Labs:  Blood type: O Positive  Rubella IgG: RPR: Negative                          13.5   11.62[H] >-----------< 268    ( 11-01 @ 01:15 )             39.9                  Physical Exam:  General: NAD  Abdomen: soft, non-tender, non-distended, fundus firm  Vaginal: Lochia wnl  Extremities: No erythema/edema

## 2024-11-03 VITALS
SYSTOLIC BLOOD PRESSURE: 127 MMHG | TEMPERATURE: 98 F | HEART RATE: 71 BPM | DIASTOLIC BLOOD PRESSURE: 60 MMHG | OXYGEN SATURATION: 100 % | RESPIRATION RATE: 15 BRPM

## 2024-11-03 LAB
CULTURE RESULTS: NO GROWTH — SIGNIFICANT CHANGE UP
SPECIMEN SOURCE: SIGNIFICANT CHANGE UP

## 2024-11-03 RX ORDER — CLOSTRIDIUM TETANI TOXOID ANTIGEN (FORMALDEHYDE INACTIVATED), CORYNEBACTERIUM DIPHTHERIAE TOXOID ANTIGEN (FORMALDEHYDE INACTIVATED), BORDETELLA PERTUSSIS TOXOID ANTIGEN (GLUTARALDEHYDE INACTIVATED), BORDETELLA PERTUSSIS FILAMENTOUS HEMAGGLUTININ ANTIGEN (FORMALDEHYDE INACTIVATED), BORDETELLA PERTUSSIS PERTACTIN ANTIGEN, AND BORDETELLA PERTUSSIS FIMBRIAE 2/3 ANTIGEN 5; 2; 2.5; 5; 3; 5 [LF]/.5ML; [LF]/.5ML; UG/.5ML; UG/.5ML; UG/.5ML; UG/.5ML
0.5 INJECTION, SUSPENSION INTRAMUSCULAR ONCE
Refills: 0 | Status: COMPLETED | OUTPATIENT
Start: 2024-11-03 | End: 2024-11-03

## 2024-11-03 RX ADMIN — Medication 975 MILLIGRAM(S): at 09:29

## 2024-11-03 RX ADMIN — Medication 975 MILLIGRAM(S): at 10:17

## 2024-11-03 RX ADMIN — Medication 600 MILLIGRAM(S): at 13:00

## 2024-11-03 RX ADMIN — CLOSTRIDIUM TETANI TOXOID ANTIGEN (FORMALDEHYDE INACTIVATED), CORYNEBACTERIUM DIPHTHERIAE TOXOID ANTIGEN (FORMALDEHYDE INACTIVATED), BORDETELLA PERTUSSIS TOXOID ANTIGEN (GLUTARALDEHYDE INACTIVATED), BORDETELLA PERTUSSIS FILAMENTOUS HEMAGGLUTININ ANTIGEN (FORMALDEHYDE INACTIVATED), BORDETELLA PERTUSSIS PERTACTIN ANTIGEN, AND BORDETELLA PERTUSSIS FIMBRIAE 2/3 ANTIGEN 0.5 MILLILITER(S): 5; 2; 2.5; 5; 3; 5 INJECTION, SUSPENSION INTRAMUSCULAR at 13:36

## 2024-11-03 RX ADMIN — Medication 600 MILLIGRAM(S): at 00:30

## 2024-11-03 RX ADMIN — Medication 600 MILLIGRAM(S): at 05:30

## 2024-11-03 RX ADMIN — Medication 1 TABLET(S): at 12:16

## 2024-11-03 RX ADMIN — Medication 600 MILLIGRAM(S): at 12:16

## 2024-11-03 RX ADMIN — Medication 600 MILLIGRAM(S): at 06:04

## 2024-11-03 NOTE — PROGRESS NOTE ADULT - SUBJECTIVE AND OBJECTIVE BOX
OB Progress Note:  PPD#2    S: 35yo PPD#2 s/p VAVD with 3rd degree laceration, c/b gHTN, chorio, and acute blood loss (QBL 562cc). Patient feels well. Pain is well controlled. She is tolerating a regular diet and passing flatus. She is voiding spontaneously, and ambulating without difficulty. Denies CP/SOB. Denies lightheadedness/dizziness. Denies N/V.        O:  Vitals:   Vital Signs Last 24 Hrs  T(C): 36.3 (2024 05:33), Max: 37.1 (2024 22:21)  T(F): 97.3 (2024 05:33), Max: 98.8 (2024 22:21)  HR: 78 (2024 05:33) (68 - 85)  BP: 129/74 (2024 05:33) (111/64 - 129/74)  BP(mean): --  RR: 18 (2024 05:33) (18 - 18)  SpO2: 99% (2024 05:33) (97% - 100%)    Parameters below as of 2024 05:33  Patient On (Oxygen Delivery Method): room air        MEDICATIONS  (STANDING):  acetaminophen     Tablet .. 975 milliGRAM(s) Oral <User Schedule>  diphtheria/tetanus/pertussis (acellular) Vaccine (Adacel) 0.5 milliLiter(s) IntraMuscular once  ibuprofen  Tablet. 600 milliGRAM(s) Oral every 6 hours  oxytocin Infusion 167 milliUNIT(s)/Min (167 mL/Hr) IV Continuous <Continuous>  polyethylene glycol 3350 17 Gram(s) Oral daily  prenatal multivitamin 1 Tablet(s) Oral daily  senna 2 Tablet(s) Oral daily  sodium chloride 0.9% lock flush 3 milliLiter(s) IV Push every 8 hours    MEDICATIONS  (PRN):  benzocaine 20%/menthol 0.5% Spray 1 Spray(s) Topical every 6 hours PRN for Perineal discomfort  dibucaine 1% Ointment 1 Application(s) Topical every 6 hours PRN Perineal discomfort  diphenhydrAMINE 25 milliGRAM(s) Oral every 6 hours PRN Pruritus  hydrocortisone 1% Cream 1 Application(s) Topical every 6 hours PRN Moderate Pain (4-6)  lanolin Ointment 1 Application(s) Topical every 6 hours PRN nipple soreness  magnesium hydroxide Suspension 30 milliLiter(s) Oral two times a day PRN Constipation  oxyCODONE    IR 5 milliGRAM(s) Oral once PRN Moderate to Severe Pain (4-10)  oxyCODONE    IR 5 milliGRAM(s) Oral every 3 hours PRN Moderate to Severe Pain (4-10)  pramoxine 1%/zinc 5% Cream 1 Application(s) Topical every 4 hours PRN Moderate Pain (4-6)  simethicone 80 milliGRAM(s) Chew every 4 hours PRN Gas  witch hazel Pads 1 Application(s) Topical every 4 hours PRN Perineal discomfort      Labs:  Blood type: O Positive  Rubella IgG: RPR: Negative                          10.5[L]   23.19[H] >-----------< 216    (  @ 09:20 )             31.1[L]                        10.8[L]   25.97[H] >-----------< 211    (  @ 06:00 )             32.1[L]                        13.5   11.62[H] >-----------< 268    (  @ 01:15 )             39.9    24 @ 09:20      138  |  102  |  9   ----------------------------<  122[H]  3.7   |  22  |  0.86        Ca    8.9      2024 09:20    TPro  5.8[L]  /  Alb  2.9[L]  /  TBili  0.4  /  DBili  x   /  AST  29  /  ALT  21  /  AlkPhos  152[H]  24 @ 09:20          Physical Exam:  General: NAD  Abdomen: soft, non-tender, non-distended, fundus firm  Vaginal: Lochia wnl  Extremities: No erythema/edema      A/P: 35yo PPD#2 s/p VAVD.  Patient is stable and doing well post-partum.      #gHTN  - BP's normotensive, no antihypertensives  - HELLP WNL, no P/C ratio obtained  - Has BP Cuff at home, offered a prescription for BP cuff but patient declined   - instructions given on BP monitoring; TID; call MD office if greater than 140/90; report to emergency room is greater than 160/110  - reviewed signs and symptoms related to preeclampsia with patient such as HA, Change in vision, N/V. RUQ pain   - keep log BP's to present to MD during appointment or triage. Instructed to schedule BP check at OB office within 1 week of discharge    #chorio  - afebrile, VSS  - WBC: 11.62 -> 25.97 -> 23.19  - Blood cultures x2: no growth at 24hrs  - s/p Zosyn    #acute blood loss  - Hct: 39.3->32.1->31.1  - s/p IM methergine    #Postpartum  - Pain well controlled, continue current pain regimen  - Increase ambulation, SCDs when not ambulating  - Continue regular diet  - Discharge planned for today      Lulu FRAZIERP-BC OB Progress Note:  PPD#2    S: 35yo PPD#2 s/p VAVD with 3rd degree laceration, c/b gHTN, chorio, and acute blood loss (QBL 562cc). Patient feels well. Pain is well controlled. She is tolerating a regular diet and passing flatus. She is voiding spontaneously, and ambulating without difficulty. Denies CP/SOB. Denies lightheadedness/dizziness, headache, visual changes, epigastric pain. Denies N/V.        O:  Vitals:   Vital Signs Last 24 Hrs  T(C): 36.3 (2024 05:33), Max: 37.1 (2024 22:21)  T(F): 97.3 (2024 05:33), Max: 98.8 (2024 22:21)  HR: 78 (2024 05:33) (68 - 85)  BP: 129/74 (2024 05:33) (111/64 - 129/74)  BP(mean): --  RR: 18 (2024 05:33) (18 - 18)  SpO2: 99% (2024 05:33) (97% - 100%)    Parameters below as of 2024 05:33  Patient On (Oxygen Delivery Method): room air        MEDICATIONS  (STANDING):  acetaminophen     Tablet .. 975 milliGRAM(s) Oral <User Schedule>  diphtheria/tetanus/pertussis (acellular) Vaccine (Adacel) 0.5 milliLiter(s) IntraMuscular once  ibuprofen  Tablet. 600 milliGRAM(s) Oral every 6 hours  oxytocin Infusion 167 milliUNIT(s)/Min (167 mL/Hr) IV Continuous <Continuous>  polyethylene glycol 3350 17 Gram(s) Oral daily  prenatal multivitamin 1 Tablet(s) Oral daily  senna 2 Tablet(s) Oral daily  sodium chloride 0.9% lock flush 3 milliLiter(s) IV Push every 8 hours    MEDICATIONS  (PRN):  benzocaine 20%/menthol 0.5% Spray 1 Spray(s) Topical every 6 hours PRN for Perineal discomfort  dibucaine 1% Ointment 1 Application(s) Topical every 6 hours PRN Perineal discomfort  diphenhydrAMINE 25 milliGRAM(s) Oral every 6 hours PRN Pruritus  hydrocortisone 1% Cream 1 Application(s) Topical every 6 hours PRN Moderate Pain (4-6)  lanolin Ointment 1 Application(s) Topical every 6 hours PRN nipple soreness  magnesium hydroxide Suspension 30 milliLiter(s) Oral two times a day PRN Constipation  oxyCODONE    IR 5 milliGRAM(s) Oral once PRN Moderate to Severe Pain (4-10)  oxyCODONE    IR 5 milliGRAM(s) Oral every 3 hours PRN Moderate to Severe Pain (4-10)  pramoxine 1%/zinc 5% Cream 1 Application(s) Topical every 4 hours PRN Moderate Pain (4-6)  simethicone 80 milliGRAM(s) Chew every 4 hours PRN Gas  witch hazel Pads 1 Application(s) Topical every 4 hours PRN Perineal discomfort      Labs:  Blood type: O Positive  Rubella IgG: RPR: Negative                          10.5[L]   23.19[H] >-----------< 216    (  @ 09:20 )             31.1[L]                        10.8[L]   25.97[H] >-----------< 211    (  @ 06:00 )             32.1[L]                        13.5   11.62[H] >-----------< 268    (  @ 01:15 )             39.9    24 @ 09:20      138  |  102  |  9   ----------------------------<  122[H]  3.7   |  22  |  0.86        Ca    8.9      2024 09:20    TPro  5.8[L]  /  Alb  2.9[L]  /  TBili  0.4  /  DBili  x   /  AST  29  /  ALT  21  /  AlkPhos  152[H]  24 @ 09:20          Physical Exam:  General: NAD  Abdomen: soft, non-tender, non-distended, fundus firm  Vaginal: Lochia wnl  Extremities: No erythema/edema      A/P: 35yo PPD#2 s/p VAVD.  Patient is stable and doing well post-partum.      #gHTN  - BP's normotensive, no antihypertensives  - HELLP WNL, no P/C ratio obtained  - Has BP Cuff at home, offered a prescription for BP cuff but patient declined   - instructions given on BP monitoring; TID; call MD office if greater than 140/90; report to emergency room is greater than 160/110  - reviewed signs and symptoms related to preeclampsia with patient such as HA, Change in vision, N/V. RUQ pain   - keep log BP's to present to MD during appointment or triage. Instructed to schedule BP check at OB office within 1 week of discharge    #chorio  - afebrile, VSS  - WBC: 11.62 -> 25.97 -> 23.19  - Blood cultures x2: no growth at 24hrs  - s/p Zosyn    #acute blood loss  - Hct: 39.3->32.1->31.1  - s/p IM methergine    #Postpartum  - Pain well controlled, continue current pain regimen  - Increase ambulation, SCDs when not ambulating  - Continue regular diet  - Discharge planned for today      Lulu Muhammad FNP-BC

## 2024-11-07 LAB
CULTURE RESULTS: SIGNIFICANT CHANGE UP
CULTURE RESULTS: SIGNIFICANT CHANGE UP
SPECIMEN SOURCE: SIGNIFICANT CHANGE UP
SPECIMEN SOURCE: SIGNIFICANT CHANGE UP

## 2024-11-22 NOTE — OB PROVIDER H&P - [id number]_ALL_OB_NU
----- Message from Jazzmine Mclaughlin MD sent at 11/22/2024  8:38 AM CST -----  Please call [facility] to review results.  All labs largely normal/within acceptable ranges. Lipid panel did bump up slightly - continue to focus on staying active and watching intake - focusing on whole foods, plant based diet.   
Patient informed via live [facility]  
0